# Patient Record
Sex: MALE | Race: WHITE | NOT HISPANIC OR LATINO | URBAN - METROPOLITAN AREA
[De-identification: names, ages, dates, MRNs, and addresses within clinical notes are randomized per-mention and may not be internally consistent; named-entity substitution may affect disease eponyms.]

---

## 2022-09-12 ENCOUNTER — EMERGENCY (EMERGENCY)
Facility: HOSPITAL | Age: 32
LOS: 1 days | Discharge: HOME | End: 2022-09-12
Attending: STUDENT IN AN ORGANIZED HEALTH CARE EDUCATION/TRAINING PROGRAM | Admitting: STUDENT IN AN ORGANIZED HEALTH CARE EDUCATION/TRAINING PROGRAM

## 2022-09-12 VITALS
SYSTOLIC BLOOD PRESSURE: 109 MMHG | RESPIRATION RATE: 18 BRPM | OXYGEN SATURATION: 99 % | WEIGHT: 160.06 LBS | HEART RATE: 63 BPM | HEIGHT: 67 IN | DIASTOLIC BLOOD PRESSURE: 76 MMHG | TEMPERATURE: 98 F

## 2022-09-12 VITALS
HEART RATE: 74 BPM | DIASTOLIC BLOOD PRESSURE: 74 MMHG | OXYGEN SATURATION: 100 % | SYSTOLIC BLOOD PRESSURE: 122 MMHG | RESPIRATION RATE: 18 BRPM | TEMPERATURE: 98 F

## 2022-09-12 PROCEDURE — 99284 EMERGENCY DEPT VISIT MOD MDM: CPT

## 2022-09-12 RX ORDER — SODIUM CHLORIDE 9 MG/ML
1000 INJECTION INTRAMUSCULAR; INTRAVENOUS; SUBCUTANEOUS ONCE
Refills: 0 | Status: COMPLETED | OUTPATIENT
Start: 2022-09-12 | End: 2022-09-12

## 2022-09-12 RX ORDER — METOCLOPRAMIDE HCL 10 MG
10 TABLET ORAL ONCE
Refills: 0 | Status: COMPLETED | OUTPATIENT
Start: 2022-09-12 | End: 2022-09-12

## 2022-09-12 RX ORDER — MECLIZINE HCL 12.5 MG
25 TABLET ORAL ONCE
Refills: 0 | Status: COMPLETED | OUTPATIENT
Start: 2022-09-12 | End: 2022-09-12

## 2022-09-12 RX ORDER — METOCLOPRAMIDE HCL 10 MG
1 TABLET ORAL
Qty: 4 | Refills: 0
Start: 2022-09-12 | End: 2022-09-13

## 2022-09-12 RX ADMIN — Medication 10 MILLIGRAM(S): at 19:15

## 2022-09-12 RX ADMIN — SODIUM CHLORIDE 1000 MILLILITER(S): 9 INJECTION INTRAMUSCULAR; INTRAVENOUS; SUBCUTANEOUS at 17:01

## 2022-09-12 RX ADMIN — Medication 25 MILLIGRAM(S): at 17:01

## 2022-09-12 RX ADMIN — SODIUM CHLORIDE 1000 MILLILITER(S): 9 INJECTION INTRAMUSCULAR; INTRAVENOUS; SUBCUTANEOUS at 19:15

## 2022-09-12 NOTE — ED PROVIDER NOTE - NSFOLLOWUPINSTRUCTIONS_ED_ALL_ED_FT
Please follow up with neurology if symptoms persist.    Please take medication as needed for dizziness.    Please know that this evaluation is incomplete until you have followed up on today's findings with a primary care or specialist physician.    Thank you and feel better soon.

## 2022-09-12 NOTE — ED PROVIDER NOTE - PATIENT PORTAL LINK FT
You can access the FollowMyHealth Patient Portal offered by [facility] by registering at the following website: [email or web address]. By joining [employer]’s FollowMyHealth portal, you will also be able to view your health information using other applications (apps) compatible with our system.

## 2022-09-12 NOTE — ED ADULT NURSE NOTE - OBJECTIVE STATEMENT
Pt presents to ED complaining of dizziness with loss of balance since yesterday evening. Progressively worsening. Denies hx of vertigo. BEFAST negative.

## 2022-09-12 NOTE — ED ADULT TRIAGE NOTE - CHIEF COMPLAINT QUOTE
c/o dizziness described as vertigo, eyes cant focus an hour after eating durian last night. denies pmhx

## 2022-09-12 NOTE — ED PROVIDER NOTE - CLINICAL SUMMARY MEDICAL DECISION MAKING FREE TEXT BOX
31 y.o. M now presents with CC of dizziness. Neither history nor exam nor patient demographic concerning for central causes of vertigo. Concern for BPPV. Pt received IVF and medications with improvement in symptoms. Epley attempted without improvement. Will send patient home to follow up with neuro if symptoms persist.

## 2022-09-12 NOTE — ED PROVIDER NOTE - NS ED ROS FT
VITAL SIGNS: I have reviewed nursing notes and confirm.   CONST: Well-developed; well-nourished; No apparent distress.  ENT: No nasal discharge; airway clear.  HEAD: Normocephalic; atraumatic.  EYES: Sclera clear. Pupils round and symmetrical bilaterally.  CARD: S1, S2 normal; no murmurs, gallops, or rubs. Regular rate and rhythm.  RESP: No wheezes, rales or rhonchi. Breathing comfortably; speaking in full sentences.   ABD: Soft; non-distended; non-tender; no rebound or guarding.  : No CVA tenderness bilaterally.  MSK: No acute deformities noted to extremities. No tenderness to cervical/thoracic/lumbar spine to palpation.  NEURO: Alert, oriented. Speech is fluent and appropriate. Moving all extremities appropriately. No gross motor or sensory abnormalities.   SKIN: Skin is normal temperature; no diaphoresis; no pallor.   PSYCH: Cooperative. Appropriate mood, language, and behavior.

## 2022-09-12 NOTE — ED PROVIDER NOTE - PHYSICAL EXAMINATION
VITAL SIGNS: I have reviewed nursing notes and confirm.   CONST: Well-developed; well-nourished; No apparent distress.  ENT: No nasal discharge; airway clear.  HEAD: Normocephalic; atraumatic.  EYES: Sclera clear. Pupils round and symmetrical bilaterally.  CARD: S1, S2 normal; no murmurs, gallops, or rubs. Regular rate and rhythm.  RESP: No wheezes, rales or rhonchi. Breathing comfortably; speaking in full sentences.   ABD: Soft; non-distended; non-tender; no rebound or guarding.  : No CVA tenderness bilaterally.  MSK: No acute deformities noted to extremities. No tenderness to cervical/thoracic/lumbar spine to palpation.  NEURO: HINTS exam negative; +nystagmus to the L; otherwise normal exam; Alert, oriented. Speech is fluent and appropriate. Moving all extremities appropriately. No gross motor or sensory abnormalities.   SKIN: Skin is normal temperature; no diaphoresis; no pallor.   PSYCH: Cooperative. Appropriate mood, language, and behavior.

## 2022-09-12 NOTE — ED PROVIDER NOTE - OBJECTIVE STATEMENT
31 y.o. M now presents with CC of dizziness. Pt reports several days of lightheadedness and dizziness. He denies fevers, chills, neck stiffness, headache, changes in vision. He reports difficulty focusing. He had a negative COVID. Regarding dizziness, symptoms remit with stillness of the head, symptoms worse on looking to the left, symptoms worse with movement, no vomiting.

## 2022-09-15 DIAGNOSIS — H81.12 BENIGN PAROXYSMAL VERTIGO, LEFT EAR: ICD-10-CM

## 2022-09-15 DIAGNOSIS — R42 DIZZINESS AND GIDDINESS: ICD-10-CM

## 2023-11-21 ENCOUNTER — EMERGENCY (EMERGENCY)
Facility: HOSPITAL | Age: 33
LOS: 1 days | Discharge: HOME | End: 2023-11-21
Admitting: STUDENT IN AN ORGANIZED HEALTH CARE EDUCATION/TRAINING PROGRAM
Payer: COMMERCIAL

## 2023-11-21 VITALS
TEMPERATURE: 98 F | HEIGHT: 67 IN | DIASTOLIC BLOOD PRESSURE: 61 MMHG | SYSTOLIC BLOOD PRESSURE: 105 MMHG | RESPIRATION RATE: 18 BRPM | WEIGHT: 164.91 LBS | HEART RATE: 71 BPM | OXYGEN SATURATION: 99 %

## 2023-11-21 PROCEDURE — 99284 EMERGENCY DEPT VISIT MOD MDM: CPT

## 2023-11-21 PROCEDURE — 99283 EMERGENCY DEPT VISIT LOW MDM: CPT | Mod: 25

## 2023-11-21 PROCEDURE — 73080 X-RAY EXAM OF ELBOW: CPT

## 2023-11-21 PROCEDURE — 73080 X-RAY EXAM OF ELBOW: CPT | Mod: 26,LT

## 2023-11-21 RX ORDER — OXYCODONE AND ACETAMINOPHEN 5; 325 MG/1; MG/1
1 TABLET ORAL
Qty: 12 | Refills: 0
Start: 2023-11-21 | End: 2023-11-23

## 2023-11-21 NOTE — ED PROVIDER NOTE - OBJECTIVE STATEMENT
32 yo m with no pmh, RHD c/o L elbow pain after he fell playing basketball. Pt landed with all his weight on his elbow. Unable to straighten elbow. Denies numbness, tingling.

## 2023-11-21 NOTE — ED PROVIDER NOTE - CLINICAL SUMMARY MEDICAL DECISION MAKING FREE TEXT BOX
34 yo m with no pmh, RHD c/o L elbow pain after he fell playing basketball. Pt landed with all his weight on his elbow. Unable to straighten elbow. Denies numbness, tingling. L elbow + tenderness and swelling,  unable to fully straighten - no tenderness to shoulder or wrist 34 yo m with no pmh, RHD c/o L elbow pain after he fell playing basketball. Pt landed with all his weight on his elbow. Unable to straighten elbow. Denies numbness, tingling. L elbow + tenderness and swelling,  unable to fully straighten - no tenderness to shoulder or wrist. Xr shows nondisplaced radial head fx, pt placed in sling, will f/u with ortho

## 2023-11-21 NOTE — ED PROVIDER NOTE - PHYSICAL EXAMINATION
CONSTITUTIONAL: Well-appearing;  in no apparent distress.   HEAD: Normocephalic; atraumatic.   EYES: PERRL; EOM intact; conjunctiva and sclera clear  ENT: normal nose; no rhinorrhea; normal pharynx with no erythema or lesions.   NECK: Supple; non-tender;   CARDIOVASCULAR: rrr,  RESPIRATORY: Breathing easily;   MSK: L elbow + tenderness and swelling,  unable to fully straighten - no tenderness to shoulder or wrist   EXT: No cyanosis or edema; N/V intact  SKIN: Normal for age and race; warm; dry; good turgor; no apparent lesions or rash.

## 2023-11-21 NOTE — ED ADULT NURSE NOTE - OBJECTIVE STATEMENT
33yoM came to ED c/o L arm pain and injury. Pt states he was palying basketball when he tripped and came hard down on his L wrist. Pt denies head strike, numbness nad tingling. No discoloration/ bruising/ trauma noted to arm. radial pulses palpable, pt able to wiggle all fingers.

## 2023-11-21 NOTE — ED PROVIDER NOTE - NSFOLLOWUPINSTRUCTIONS_ED_ALL_ED_FT
Follow up with the orthopedist.   Take motrin and or tylenol as needed for pain, for severe pain take percocet     Elbow Fracture    WHAT YOU NEED TO KNOW:    An elbow fracture is a break in one or more of the 3 bones that form your elbow joint. Osteoporosis (brittle bones) can increase your risk for an elbow fracture.  Adult Arm Bones    DISCHARGE INSTRUCTIONS:    Seek care immediately if:    Your skin becomes swollen, cold, or pale.    Your elbow, hand, or fingers are numb.  Call your doctor if:    You have a fever.    The pain gets worse, even after you rest and take your medicine.    You have new or more trouble moving your arm.    You have new sores around the area of your brace, splint, or cast.    Your brace, splint, or cast becomes damaged.    You have questions or concerns about your condition or care.  Medicines: You may need any of the following:    Prescription pain medicine may be given. Ask your healthcare provider how to take this medicine safely. Some prescription pain medicines contain acetaminophen. Do not take other medicines that contain acetaminophen without talking to your healthcare provider. Too much acetaminophen may cause liver damage. Prescription pain medicine may cause constipation. Ask your healthcare provider how to prevent or treat constipation.    NSAIDs, such as ibuprofen, help decrease swelling, pain, and fever. This medicine is available with or without a doctor's order. NSAIDs can cause stomach bleeding or kidney problems in certain people. If you take blood thinner medicine, always ask your healthcare provider if NSAIDs are safe for you. Always read the medicine label and follow directions.    Take your medicine as directed. Contact your healthcare provider if you think your medicine is not helping or if you have side effects. Tell your provider if you are allergic to any medicine. Keep a list of the medicines, vitamins, and herbs you take. Include the amounts, and when and why you take them. Bring the list or the pill bottles to follow-up visits. Carry your medicine list with you in case of an emergency.  Self-care:    Elevate your elbow above the level of your heart as often as you can. This will help decrease swelling and pain. Prop your elbow on pillows or blankets to keep it elevated comfortably. While your elbow is elevated, wiggle your fingers and open and close them to prevent hand stiffness.        Apply ice on your elbow on your elbow for 15 to 20 minutes every hour or as directed. Use an ice pack, or put crushed ice in a plastic bag. Cover it with a towel. Ice helps prevent tissue damage and decreases swelling and pain.    Go to physical therapy as directed. A physical therapist can teach you exercises to help improve movement and strength and to decrease pain.  Care for your brace, cast, or splint: Follow instructions about when you may take a bath or shower. It is important not to get your brace, cast, or splint wet. Cover your device with a plastic bag before you bathe. Tape the bag to your skin above the device to help keep out water. Hold your elbow away from the water in case the bag breaks.    Check the skin around your cast, brace, or splint daily for any redness or open skin.    Do not use a sharp or pointed object to scratch your skin under the cast, brace, or splint.    Do not remove your brace or splint unless directed.  Follow up with your doctor as directed: You may need to have your brace, splint, cast, or stitches removed. You may need x-rays to check how well the bones are healing. Write down your questions so you remember to ask them during your visits.

## 2023-11-21 NOTE — ED PROVIDER NOTE - CARE PROVIDER_API CALL
Marcelo Simmons  Orthopaedic Surgery  [address]  [place] [address]  Phone: [phone]  Fax: [phone]  Follow Up Time:     Alex Laguna  Orthopaedic Surgery  [address] [place] [address], [address]  [place] [address]  Phone: [phone]  Fax: [phone]  Follow Up Time:

## 2023-11-21 NOTE — ED PROVIDER NOTE - PATIENT PORTAL LINK FT
You can access the FollowMyHealth Patient Portal offered by [facility] by registering at the following website: [email or web address]. By joining [employer]’s FollowMyHealth portal, you will also be able to view your health information using other applications (apps) compatible with our system.

## 2023-11-21 NOTE — ED ADULT NURSE NOTE - NSFALLRISKINTERV_ED_ALL_ED

## 2023-11-22 PROBLEM — Z78.9 OTHER SPECIFIED HEALTH STATUS: Chronic | Status: ACTIVE | Noted: 2022-09-12

## 2023-11-23 DIAGNOSIS — S52.125A NONDISPLACED FRACTURE OF HEAD OF LEFT RADIUS, INITIAL ENCOUNTER FOR CLOSED FRACTURE: ICD-10-CM
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
## 2023-11-23 DIAGNOSIS — M25.522 PAIN IN LEFT ELBOW: ICD-10-CM
